# Patient Record
Sex: MALE | Race: WHITE | Employment: FULL TIME | URBAN - METROPOLITAN AREA
[De-identification: names, ages, dates, MRNs, and addresses within clinical notes are randomized per-mention and may not be internally consistent; named-entity substitution may affect disease eponyms.]

---

## 2024-11-10 ENCOUNTER — HOSPITAL ENCOUNTER (EMERGENCY)
Age: 69
Discharge: HOME | End: 2024-11-10
Payer: MEDICARE

## 2024-11-10 VITALS
SYSTOLIC BLOOD PRESSURE: 147 MMHG | WEIGHT: 186 LBS | HEIGHT: 71 IN | RESPIRATION RATE: 16 BRPM | TEMPERATURE: 98.5 F | BODY MASS INDEX: 26.04 KG/M2 | OXYGEN SATURATION: 96 % | DIASTOLIC BLOOD PRESSURE: 80 MMHG | HEART RATE: 68 BPM

## 2024-11-10 DIAGNOSIS — S60.551A SUPERFICIAL FOREIGN BODY OF RIGHT HAND, INITIAL ENCOUNTER: Primary | ICD-10-CM

## 2024-11-10 DIAGNOSIS — Z23 TETANUS TOXOID VACCINATION ADMINISTERED AT CURRENT VISIT: ICD-10-CM

## 2024-11-10 PROCEDURE — 90471 IMMUNIZATION ADMIN: CPT | Performed by: PHYSICIAN ASSISTANT

## 2024-11-10 PROCEDURE — 90714 TD VACC NO PRESV 7 YRS+ IM: CPT | Performed by: PHYSICIAN ASSISTANT

## 2024-11-10 PROCEDURE — 6360000002 HC RX W HCPCS: Performed by: PHYSICIAN ASSISTANT

## 2024-11-10 PROCEDURE — 99284 EMERGENCY DEPT VISIT MOD MDM: CPT

## 2024-11-10 RX ORDER — DOXYCYCLINE HYCLATE 100 MG
100 TABLET ORAL 2 TIMES DAILY
Qty: 20 TABLET | Refills: 0 | Status: SHIPPED | OUTPATIENT
Start: 2024-11-10 | End: 2024-11-20

## 2024-11-10 RX ADMIN — CLOSTRIDIUM TETANI TOXOID ANTIGEN (FORMALDEHYDE INACTIVATED) AND CORYNEBACTERIUM DIPHTHERIAE TOXOID ANTIGEN (FORMALDEHYDE INACTIVATED) 0.5 ML: 5; 2 INJECTION, SUSPENSION INTRAMUSCULAR at 16:00

## 2024-11-10 ASSESSMENT — PAIN - FUNCTIONAL ASSESSMENT: PAIN_FUNCTIONAL_ASSESSMENT: 0-10

## 2024-11-10 ASSESSMENT — PAIN SCALES - GENERAL: PAINLEVEL_OUTOF10: 0

## 2024-11-10 NOTE — ED PROVIDER NOTES
discussed: yes      Risks discussed:  Bleeding, incomplete removal, infection, nerve damage, pain, poor cosmetic result and worsening of condition    Alternatives discussed:  No treatment  Universal protocol:     Patient identity confirmed:  Verbally with patient  Location:     Location:  Hand    Hand location:  R palm    Depth:  Intradermal    Tendon involvement:  None  Pre-procedure details:     Imaging:  None    Preparation: Patient was prepped and draped in usual sterile fashion    Anesthesia:     Anesthesia method:  Local infiltration    Local anesthetic:  Lidocaine 1% w/o epi  Procedure type:     Procedure complexity:  Simple  Procedure details:     Localization method:  Visualized    Dissection of underlying tissues: no      Bloodless field: no      Removal mechanism:  Hemostat    Foreign bodies recovered:  1    Description:  Splinter/wood    Intact foreign body removal: yes    Post-procedure details:     Neurovascular status: intact      Confirmation:  No additional foreign bodies on visualization    Skin closure:  None    Dressing:  Bulky dressing    Procedure completion:  Tolerated well, no immediate complications      CRITICAL CARE TIME (.cctime)       PAST MEDICAL HISTORY      has a past medical history of Hypertension.     EMERGENCY DEPARTMENT COURSE and DIFFERENTIAL DIAGNOSIS/MDM:   Vitals:    Vitals:    11/10/24 1519 11/10/24 1519   BP:  (!) 147/80   Pulse:  68   Resp:  16   Temp:  98.5 °F (36.9 °C)   TempSrc:  Oral   SpO2:  96%   Weight: 84.4 kg (186 lb)    Height: 1.803 m (5' 11\")        Patient was given the following medications:  Medications   tetanus & diphtheria toxoids (adult) 5-2 LFU injection 0.5 mL (0.5 mLs IntraMUSCular Given 11/10/24 1600)                 Is this patient to be included in the SEP-1 Core Measure due to severe sepsis or septic shock?   No   Exclusion criteria - the patient is NOT to be included for SEP-1 Core Measure due to:  Infection is not suspected    Chronic Conditions

## 2024-11-11 NOTE — ED NOTES
11/11/24  Pt contacted re: ED visit 11/10/24; \"doing well\". Advised to return if any problems/concerns.